# Patient Record
Sex: MALE | Race: BLACK OR AFRICAN AMERICAN | NOT HISPANIC OR LATINO | Employment: STUDENT
[De-identification: names, ages, dates, MRNs, and addresses within clinical notes are randomized per-mention and may not be internally consistent; named-entity substitution may affect disease eponyms.]

---
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
## 2021-07-31 ENCOUNTER — TELEPHONE (OUTPATIENT)
Dept: SCHEDULING | Age: 21
End: 2021-07-31

## 2021-08-01 ENCOUNTER — WALK IN (OUTPATIENT)
Dept: URGENT CARE | Age: 21
End: 2021-08-01

## 2021-08-01 DIAGNOSIS — L30.9 DERMATITIS: Primary | ICD-10-CM

## 2021-08-01 PROCEDURE — 99212 OFFICE O/P EST SF 10 MIN: CPT | Performed by: NURSE PRACTITIONER

## 2021-08-01 RX ORDER — TRIAMCINOLONE ACETONIDE 1 MG/G
OINTMENT TOPICAL 2 TIMES DAILY
Qty: 80 G | Refills: 0 | Status: SHIPPED | OUTPATIENT
Start: 2021-08-01

## 2021-08-01 RX ORDER — FLUOCINOLONE ACETONIDE 0.11 MG/ML
OIL TOPICAL
Qty: 120 ML | Refills: 0 | Status: SHIPPED | OUTPATIENT
Start: 2021-08-01

## 2022-01-10 ENCOUNTER — IMAGING SERVICES (OUTPATIENT)
Dept: GENERAL RADIOLOGY | Age: 22
End: 2022-01-10

## 2022-01-10 ENCOUNTER — WALK IN (OUTPATIENT)
Dept: URGENT CARE | Age: 22
End: 2022-01-10

## 2022-01-10 VITALS
OXYGEN SATURATION: 99 % | DIASTOLIC BLOOD PRESSURE: 81 MMHG | RESPIRATION RATE: 20 BRPM | SYSTOLIC BLOOD PRESSURE: 147 MMHG | HEART RATE: 88 BPM | TEMPERATURE: 98.5 F

## 2022-01-10 DIAGNOSIS — R06.02 SHORTNESS OF BREATH: ICD-10-CM

## 2022-01-10 DIAGNOSIS — R06.02 SHORTNESS OF BREATH: Primary | ICD-10-CM

## 2022-01-10 PROCEDURE — 99213 OFFICE O/P EST LOW 20 MIN: CPT | Performed by: NURSE PRACTITIONER

## 2022-01-10 PROCEDURE — 71046 X-RAY EXAM CHEST 2 VIEWS: CPT | Performed by: NURSE PRACTITIONER

## 2022-01-10 RX ORDER — ALBUTEROL SULFATE 90 UG/1
2 AEROSOL, METERED RESPIRATORY (INHALATION) EVERY 4 HOURS PRN
Qty: 1 EACH | Refills: 0 | Status: SHIPPED | OUTPATIENT
Start: 2022-01-10

## 2022-01-10 ASSESSMENT — ENCOUNTER SYMPTOMS
CONSTITUTIONAL NEGATIVE: 1
PSYCHIATRIC NEGATIVE: 1
NEUROLOGICAL NEGATIVE: 1
SHORTNESS OF BREATH: 1
GASTROINTESTINAL NEGATIVE: 1

## 2022-02-01 ENCOUNTER — TELEPHONE (OUTPATIENT)
Dept: SCHEDULING | Age: 22
End: 2022-02-01

## 2022-02-02 ENCOUNTER — APPOINTMENT (OUTPATIENT)
Dept: INTERNAL MEDICINE | Age: 22
End: 2022-02-02

## 2022-02-03 ENCOUNTER — LAB SERVICES (OUTPATIENT)
Dept: LAB | Age: 22
End: 2022-02-03

## 2022-02-03 ENCOUNTER — OFFICE VISIT (OUTPATIENT)
Dept: INTERNAL MEDICINE | Age: 22
End: 2022-02-03

## 2022-02-03 VITALS
WEIGHT: 230.5 LBS | DIASTOLIC BLOOD PRESSURE: 70 MMHG | TEMPERATURE: 97.8 F | SYSTOLIC BLOOD PRESSURE: 118 MMHG | HEIGHT: 69 IN | BODY MASS INDEX: 34.14 KG/M2 | HEART RATE: 62 BPM

## 2022-02-03 DIAGNOSIS — R06.09 DYSPNEA ON EXERTION: ICD-10-CM

## 2022-02-03 DIAGNOSIS — R06.09 DYSPNEA ON EXERTION: Primary | ICD-10-CM

## 2022-02-03 PROCEDURE — 36415 COLL VENOUS BLD VENIPUNCTURE: CPT | Performed by: PSYCHIATRY & NEUROLOGY

## 2022-02-03 PROCEDURE — 99214 OFFICE O/P EST MOD 30 MIN: CPT | Performed by: INTERNAL MEDICINE

## 2022-02-03 PROCEDURE — 86140 C-REACTIVE PROTEIN: CPT | Performed by: PSYCHIATRY & NEUROLOGY

## 2022-02-03 PROCEDURE — 83880 ASSAY OF NATRIURETIC PEPTIDE: CPT | Performed by: PSYCHIATRY & NEUROLOGY

## 2022-02-03 PROCEDURE — 83036 HEMOGLOBIN GLYCOSYLATED A1C: CPT | Performed by: PSYCHIATRY & NEUROLOGY

## 2022-02-03 PROCEDURE — 80050 GENERAL HEALTH PANEL: CPT | Performed by: PSYCHIATRY & NEUROLOGY

## 2022-02-03 RX ORDER — PREDNISONE 20 MG/1
TABLET ORAL
COMMUNITY
Start: 2022-01-04

## 2022-02-03 ASSESSMENT — PATIENT HEALTH QUESTIONNAIRE - PHQ9
SUM OF ALL RESPONSES TO PHQ9 QUESTIONS 1 AND 2: 0
2. FEELING DOWN, DEPRESSED OR HOPELESS: NOT AT ALL
SUM OF ALL RESPONSES TO PHQ9 QUESTIONS 1 AND 2: 0
1. LITTLE INTEREST OR PLEASURE IN DOING THINGS: NOT AT ALL
CLINICAL INTERPRETATION OF PHQ2 SCORE: NO FURTHER SCREENING NEEDED

## 2022-02-03 ASSESSMENT — PAIN SCALES - GENERAL: PAINLEVEL: 0

## 2022-02-04 LAB
ALBUMIN SERPL-MCNC: 4.7 G/DL (ref 3.6–5.1)
ALBUMIN/GLOB SERPL: 1.4 {RATIO} (ref 1–2.4)
ALP SERPL-CCNC: 112 UNITS/L (ref 45–117)
ALT SERPL-CCNC: 19 UNITS/L
ANION GAP SERPL CALC-SCNC: 8 MMOL/L (ref 10–20)
AST SERPL-CCNC: 19 UNITS/L
BASOPHILS # BLD: 0 K/MCL (ref 0–0.3)
BASOPHILS NFR BLD: 0 %
BILIRUB SERPL-MCNC: 0.6 MG/DL (ref 0.2–1)
BUN SERPL-MCNC: 12 MG/DL (ref 6–20)
BUN/CREAT SERPL: 9 (ref 7–25)
CALCIUM SERPL-MCNC: 9.9 MG/DL (ref 8.4–10.2)
CHLORIDE SERPL-SCNC: 105 MMOL/L (ref 98–107)
CO2 SERPL-SCNC: 29 MMOL/L (ref 21–32)
CREAT SERPL-MCNC: 1.28 MG/DL (ref 0.67–1.17)
CRP SERPL-MCNC: 0.4 MG/DL
DEPRECATED RDW RBC: 40.3 FL (ref 39–50)
EOSINOPHIL # BLD: 0 K/MCL (ref 0–0.5)
EOSINOPHIL NFR BLD: 0 %
ERYTHROCYTE [DISTWIDTH] IN BLOOD: 11.8 % (ref 11–15)
FASTING DURATION TIME PATIENT: ABNORMAL H
GFR SERPLBLD BASED ON 1.73 SQ M-ARVRAT: >90 ML/MIN
GLOBULIN SER-MCNC: 3.4 G/DL (ref 2–4)
GLUCOSE SERPL-MCNC: 92 MG/DL (ref 70–99)
HBA1C MFR BLD: 4.4 % (ref 4.5–5.6)
HCT VFR BLD CALC: 46 % (ref 39–51)
HGB BLD-MCNC: 14.9 G/DL (ref 13–17)
IMM GRANULOCYTES # BLD AUTO: 0 K/MCL (ref 0–0.2)
IMM GRANULOCYTES # BLD: 0 %
LYMPHOCYTES # BLD: 1.2 K/MCL (ref 1–4.8)
LYMPHOCYTES NFR BLD: 22 %
MCH RBC QN AUTO: 30.2 PG (ref 26–34)
MCHC RBC AUTO-ENTMCNC: 32.4 G/DL (ref 32–36.5)
MCV RBC AUTO: 93.3 FL (ref 78–100)
MONOCYTES # BLD: 0.5 K/MCL (ref 0.3–0.9)
MONOCYTES NFR BLD: 10 %
NEUTROPHILS # BLD: 3.5 K/MCL (ref 1.8–7.7)
NEUTROPHILS NFR BLD: 68 %
NRBC BLD MANUAL-RTO: 0 /100 WBC
NT-PROBNP SERPL-MCNC: 42 PG/ML
PLATELET # BLD AUTO: 247 K/MCL (ref 140–450)
POTASSIUM SERPL-SCNC: 4.3 MMOL/L (ref 3.4–5.1)
PROT SERPL-MCNC: 8.1 G/DL (ref 6.4–8.2)
RBC # BLD: 4.93 MIL/MCL (ref 4.5–5.9)
SODIUM SERPL-SCNC: 138 MMOL/L (ref 135–145)
TSH SERPL-ACNC: 2.66 MCUNITS/ML (ref 0.35–5)
WBC # BLD: 5.2 K/MCL (ref 4.2–11)

## 2022-03-09 ENCOUNTER — APPOINTMENT (OUTPATIENT)
Dept: INTERNAL MEDICINE | Age: 22
End: 2022-03-09

## 2022-05-09 ENCOUNTER — APPOINTMENT (OUTPATIENT)
Age: 22
Setting detail: DERMATOLOGY
End: 2022-05-09

## 2022-05-09 DIAGNOSIS — L81.0 POSTINFLAMMATORY HYPERPIGMENTATION: ICD-10-CM

## 2022-05-09 DIAGNOSIS — L40.0 PSORIASIS VULGARIS: ICD-10-CM

## 2022-05-09 PROCEDURE — 99204 OFFICE O/P NEW MOD 45 MIN: CPT

## 2022-05-09 PROCEDURE — OTHER ADDITIONAL NOTES: OTHER

## 2022-05-09 PROCEDURE — OTHER PRESCRIPTION MEDICATION MANAGEMENT: OTHER

## 2022-05-09 PROCEDURE — OTHER PRESCRIPTION: OTHER

## 2022-05-09 PROCEDURE — OTHER COUNSELING: OTHER

## 2022-05-09 PROCEDURE — OTHER EDUCATIONAL RESOURCES PROVIDED: OTHER

## 2022-05-09 RX ORDER — HYDROQUINONE 40 MG/G
CREAM TOPICAL BID
Qty: 28.35 | Refills: 3 | Status: ERX | COMMUNITY
Start: 2022-05-09

## 2022-05-09 RX ORDER — BETAMETHASONE DIPROPIONATE 0.5 MG/G
OINTMENT, AUGMENTED TOPICAL BID
Qty: 50 | Refills: 1 | Status: ERX | COMMUNITY
Start: 2022-05-09

## 2022-05-09 ASSESSMENT — LOCATION DETAILED DESCRIPTION DERM
LOCATION DETAILED: RIGHT VENTRAL PROXIMAL FOREARM
LOCATION DETAILED: RIGHT PROXIMAL PRETIBIAL REGION
LOCATION DETAILED: LEFT VENTRAL DISTAL FOREARM
LOCATION DETAILED: LEFT PROXIMAL PRETIBIAL REGION
LOCATION DETAILED: RIGHT INFERIOR MEDIAL MALAR CHEEK
LOCATION DETAILED: RIGHT DISTAL PRETIBIAL REGION
LOCATION DETAILED: LEFT DISTAL PRETIBIAL REGION
LOCATION DETAILED: LEFT INFERIOR MEDIAL MALAR CHEEK

## 2022-05-09 ASSESSMENT — LOCATION SIMPLE DESCRIPTION DERM
LOCATION SIMPLE: LEFT FOREARM
LOCATION SIMPLE: RIGHT CHEEK
LOCATION SIMPLE: LEFT CHEEK
LOCATION SIMPLE: LEFT PRETIBIAL REGION
LOCATION SIMPLE: RIGHT FOREARM
LOCATION SIMPLE: RIGHT PRETIBIAL REGION

## 2022-05-09 ASSESSMENT — LOCATION ZONE DERM
LOCATION ZONE: ARM
LOCATION ZONE: LEG
LOCATION ZONE: FACE

## 2022-05-09 NOTE — PROCEDURE: PRESCRIPTION MEDICATION MANAGEMENT
Render In Strict Bullet Format?: No
Plan: Discussed Tx options including
Detail Level: Zone
Initiate Treatment: -\\n\\nhydroquinone 4 % topical cream BID
Initiate Treatment: -\\n\\nbetamethasone, augmented 0.05 % topical ointment BID
Discontinue Regimen: Triamcinolone ointment prescribed by another healthcare provider

## 2022-05-09 NOTE — PROCEDURE: ADDITIONAL NOTES
Detail Level: Simple
Additional Notes: Pt sent GoodRx coupon for [facility]
Render Risk Assessment In Note?: no
Additional Notes: -\\n\\nInstructed Pt to inform his other healthcare providers that he has a history of psoriasis. Discussed that the new medicines are unlikely to help with areas of post inflammatory hyperpigmentation. Counseled that 8 to 20% of patients with psoriasis May experience achy joints, which is more likely to be psoriasis in the joints compared to genuine psoriatic arthritis. Discussed he does not need Selsun blue or scalp oil as his scalp is doing well.

## 2023-06-02 RX ORDER — FLUOCINOLONE ACETONIDE 0.11 MG/ML
OIL TOPICAL
Qty: 120 ML | Refills: 0 | Status: CANCELLED | OUTPATIENT
Start: 2023-06-02

## 2024-06-20 ENCOUNTER — V-VISIT (OUTPATIENT)
Dept: URGENT CARE | Age: 24
End: 2024-06-20

## 2024-06-20 VITALS
TEMPERATURE: 98.2 F | HEART RATE: 82 BPM | HEIGHT: 68 IN | OXYGEN SATURATION: 98 % | RESPIRATION RATE: 18 BRPM | SYSTOLIC BLOOD PRESSURE: 123 MMHG | BODY MASS INDEX: 36.09 KG/M2 | WEIGHT: 238.1 LBS | DIASTOLIC BLOOD PRESSURE: 76 MMHG

## 2024-06-20 DIAGNOSIS — R09.81 CONGESTION OF NASAL SINUS: ICD-10-CM

## 2024-06-20 DIAGNOSIS — U07.1 COVID-19 VIRUS INFECTION: Primary | ICD-10-CM

## 2024-06-20 LAB
FLUAV AG UPPER RESP QL IA.RAPID: NEGATIVE
FLUBV AG UPPER RESP QL IA.RAPID: NEGATIVE
SARS-COV+SARS-COV-2 AG RESP QL IA.RAPID: DETECTED
TEST LOT EXPIRATION DATE: ABNORMAL
TEST LOT NUMBER: ABNORMAL

## 2024-06-20 ASSESSMENT — ENCOUNTER SYMPTOMS
NAUSEA: 0
SHORTNESS OF BREATH: 0
CHILLS: 0
FEVER: 0
WHEEZING: 0
COUGH: 0
TROUBLE SWALLOWING: 0
SORE THROAT: 0
HEADACHES: 1
DIARRHEA: 0
FATIGUE: 0
VOMITING: 0

## 2024-06-23 ENCOUNTER — TELEPHONE (OUTPATIENT)
Dept: URGENT CARE | Age: 24
End: 2024-06-23

## 2024-12-21 ENCOUNTER — TELEPHONE (OUTPATIENT)
Dept: URGENT CARE | Age: 24
End: 2024-12-21

## 2024-12-21 ENCOUNTER — WALK IN (OUTPATIENT)
Dept: URGENT CARE | Age: 24
End: 2024-12-21

## 2024-12-21 VITALS
TEMPERATURE: 97.2 F | BODY MASS INDEX: 37.89 KG/M2 | OXYGEN SATURATION: 99 % | WEIGHT: 250 LBS | DIASTOLIC BLOOD PRESSURE: 72 MMHG | RESPIRATION RATE: 18 BRPM | HEART RATE: 63 BPM | SYSTOLIC BLOOD PRESSURE: 112 MMHG | HEIGHT: 68 IN

## 2024-12-21 DIAGNOSIS — J45.901 ASTHMA EXACERBATION, MILD (CMD): Primary | ICD-10-CM

## 2024-12-21 DIAGNOSIS — R09.81 NASAL CONGESTION: ICD-10-CM

## 2024-12-21 DIAGNOSIS — R06.02 SHORTNESS OF BREATH: ICD-10-CM

## 2024-12-21 LAB
INTERNAL PROCEDURAL CONTROLS ACCEPTABLE: YES
SARS-COV+SARS-COV-2 AG RESP QL IA.RAPID: NOT DETECTED
TEST LOT EXPIRATION DATE: NORMAL
TEST LOT NUMBER: NORMAL

## 2024-12-21 RX ORDER — ALBUTEROL SULFATE 90 UG/1
2 INHALANT RESPIRATORY (INHALATION) EVERY 4 HOURS PRN
Qty: 6.7 G | Refills: 0 | Status: SHIPPED | OUTPATIENT
Start: 2024-12-21

## 2024-12-21 RX ORDER — ALBUTEROL SULFATE 0.83 MG/ML
2.5 SOLUTION RESPIRATORY (INHALATION) EVERY 6 HOURS PRN
Qty: 75 ML | Refills: 0 | Status: SHIPPED | OUTPATIENT
Start: 2024-12-21

## 2024-12-21 ASSESSMENT — ENCOUNTER SYMPTOMS
SINUS PAIN: 0
APNEA: 0
SWEATS: 0
WEIGHT LOSS: 0
HEARTBURN: 0
COUGH: 1
DIZZINESS: 0
SWOLLEN GLANDS: 0
FEVER: 0
EYES NEGATIVE: 1
HEADACHES: 0
WEAKNESS: 0
ALLERGIC/IMMUNOLOGIC NEGATIVE: 1
STRIDOR: 0
SHORTNESS OF BREATH: 1
NAUSEA: 0
TROUBLE SWALLOWING: 0
SORE THROAT: 0
PSYCHIATRIC NEGATIVE: 1
CHEST TIGHTNESS: 0
CHILLS: 0
VOMITING: 0
CHOKING: 0
RHINORRHEA: 0
APPETITE CHANGE: 0
WHEEZING: 1
ABDOMINAL PAIN: 0
VISUAL CHANGE: 0
SINUS PRESSURE: 0

## 2025-01-09 NOTE — HPI: RASH
[facility] CARE COORDINATION        January 9, 2025      Aleshia Land  [address] [facility] [place] [address]        Dear Aleshia,                                                                      The Financial Resource team has attempted to reach to you to assist you with any financial barriers you may be facing in your healthcare journey.  We would like to provide any additional support you may need related to financial support for your healthcare.  Our team are Certified [employer] Navigators, and we offer support with application assistance for Medical Assistance, [employer] Applications, Energy Assistance, Emergency Assistance, Food Assistance and many other initial applications for [facility] benefits.     I would appreciate if you would call me at [phone] to let me know if you would like assistance.      Sincerely,                                                                         Charito George  Financial Resource Worker  [facility] Care Coordination  [phone]                    
What Type Of Note Output Would You Prefer (Optional)?: Standard Output
How Severe Is Your Rash?: moderate
Is This A New Presentation, Or A Follow-Up?: Rash
Additional History: He reports being diagnosed with psoriasis in 2019. So far he has tried steroid topical which provided relief in some areas but not the legs or face. Also tried an unspecified solution for the scalp.